# Patient Record
Sex: MALE | Race: BLACK OR AFRICAN AMERICAN
[De-identification: names, ages, dates, MRNs, and addresses within clinical notes are randomized per-mention and may not be internally consistent; named-entity substitution may affect disease eponyms.]

---

## 2019-02-21 ENCOUNTER — HOSPITAL ENCOUNTER (INPATIENT)
Dept: HOSPITAL 74 - YASAS | Age: 74
LOS: 4 days | Discharge: HOME | DRG: 897 | End: 2019-02-25
Attending: SURGERY | Admitting: SURGERY
Payer: COMMERCIAL

## 2019-02-21 VITALS — BODY MASS INDEX: 23.5 KG/M2

## 2019-02-21 DIAGNOSIS — J44.9: ICD-10-CM

## 2019-02-21 DIAGNOSIS — F12.20: ICD-10-CM

## 2019-02-21 DIAGNOSIS — K27.9: ICD-10-CM

## 2019-02-21 DIAGNOSIS — F10.230: Primary | ICD-10-CM

## 2019-02-21 DIAGNOSIS — K74.60: ICD-10-CM

## 2019-02-21 DIAGNOSIS — E78.00: ICD-10-CM

## 2019-02-21 DIAGNOSIS — Z89.021: ICD-10-CM

## 2019-02-21 DIAGNOSIS — F32.9: ICD-10-CM

## 2019-02-21 DIAGNOSIS — F41.8: ICD-10-CM

## 2019-02-21 PROCEDURE — HZ2ZZZZ DETOXIFICATION SERVICES FOR SUBSTANCE ABUSE TREATMENT: ICD-10-PCS | Performed by: SURGERY

## 2019-02-21 RX ADMIN — SERTRALINE HYDROCHLORIDE SCH MG: 50 TABLET ORAL at 17:34

## 2019-02-21 RX ADMIN — ATORVASTATIN CALCIUM SCH MG: 10 TABLET, FILM COATED ORAL at 22:22

## 2019-02-21 RX ADMIN — Medication SCH MG: at 22:21

## 2019-02-21 RX ADMIN — CYCLOBENZAPRINE HYDROCHLORIDE PRN MG: 10 TABLET, FILM COATED ORAL at 22:24

## 2019-02-21 RX ADMIN — PSYLLIUM HUSK SCH GM: 3.4 POWDER ORAL at 15:46

## 2019-02-21 NOTE — HP
CIWA Score


Nausea/Vomitin


Muscle Tremors: 2


Anxiety: 2


Agitation: 2


Paroxysmal Sweats: 1-Minimal Palms Moist


Orientation: 0-Oriented


Tacttile Disturbances: 1-Very Mild Itch/Numbness


Auditory Disturbances: 1-Very Mild


Visual Disturbances: 0-None


Headache: 2-Mild


CIWA-Ar Total Score: 13





- Admission Criteria


[facility] Guidelines: Admission for Medically Managed Detox: 


Requires at least one of the followin. CIWA greater than 12


2. Seizures within the past 24 hours


3. Delirium tremens within the past 24 hours


4. Hallucinations within the past 24 hours


5. Acute intervention needed for co  occurring medical disorder


6. Acute intervention needed for co  occurring psychiatric disorder


7. Severe withdrawal that cannot be handled at a lower level of care (continued


    vomiting, continued diarrhea, abnormal vital signs) requiring intravenous


    medication and/or fluids


8. Pregnancy





Patient presents the following: CIWA greater than 12


Admission Criteria Met: Admission criteria met





Admission ROS [facility]





- HPI


Chief Complaint: 





i need help to stop drinking alcohol and marijuana


Allergies/Adverse Reactions: 


 Allergies











Allergy/AdvReac Type Severity Reaction Status Date / Time


 


No Known Allergies Allergy   Verified 19 13:39











History of Present Illness: 





this 73 years old male with alcohol dependence and marijuana dependence,seeking 

detox from alcohol,marijuana,withdrawal symptom,last detox in  [facility]


hepatitis c treated


anxiety,depression


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 


no significant period of sobriety


cirrhosis


history of copd








- Ebola screening


Have you traveled outside of the country in the last 21 days: No


Have you had contact with anyone from an Ebola affected area: No


Have you been sick,other than usual withdrawal symptoms: No


Do you have a fever: No





- Review of Systems


Constitutional: Loss of Appetite, Malaise, Night Sweats, Changes in sleep, 

Weakness


EENT: reports: No Symptoms Reported


Respiratory: reports: No Symptoms reported


Cardiac: reports: No Symptoms Reported


GI: reports: Nausea, Abdominal cramping


: reports: No Symptoms Reported


Musculoskeletal: reports: Back Pain, Muscle Pain


Integumentary: reports: Dryness


Neuro: reports: Headache, Tremors


Endocrine: reports: No Symptoms Reported


Hematology: reports: No Symptoms Reported


Psychiatric: reports: No Sypmtoms Reported, Judgement Intact, Mood/Affect 

Appropiate, Orientated x3


Other Systems: Reviewed and Negative





Patient History





- Patient Medical History


Hx Anemia: No


Hx Asthma: No


Hx Chronic Obstructive Pulmonary Disease (COPD): No


Hx Cancer: No


Hx Cardiac Disorders: No


Hx Congestive Heart Failure: No


Hx Hypertension: No


Hx Hypercholesterolemia: No


Hx Pacemaker: No


HX Cerebrovascular Accident: No


Hx Seizures: No


Hx Dementia: No


Hx Diabetes: No


Hx Gastrointestinal Disorders: Yes (peptic ulcer)


Hx Liver Disease: Yes (cirrhois)


Hx Genitourinary Disorders: No


Hx Sexually Transmitted Disorders: No


Hx Renal Disease (ESRD): No


Hx Thyroid Disease: No


Hx Human Immunodeficiency Virus (HIV): No (in )


Hx Hepatitis C: Yes (treated)


Hx Depression: No


Hx Suicide Attempt: No


Hx Bipolar Disorder: No


Hx Schizophrenia: No


Other Medical History: no suicidal,no homicidal





- Patient Surgical History


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





- PPD History


Previous Implant?: Yes


Documented Results: Positive w/o proof





- Smoking Cessation


Smoking history: Never smoked





- Substance & Tx. History


Hx Alcohol Use: Yes


Hx Substance Use: Yes


Substance Use Type: Alcohol, Marijuana


Hx Substance Use Treatment: Yes ( [facility] )





- Substances Abused


  ** Alcohol


Route: Oral


Frequency: Daily


Amount used: 1 pint rum or wine


Age of first use: 16


Date of Last Use: 19





  ** Marijuana/Hashish


Route: Smoking


Frequency: Daily


Amount used: $20


Age of first use: 16


Date of Last Use: 19





Family Disease History





- Family Disease History


Family Disease History: Other: Brother (alcohol)





Admission Physical Exam [facility]





- Vital Signs


Vital Signs: 


 Vital Signs - 24 hr











  19





  11:24


 


Temperature 98.8 F


 


Pulse Rate 83


 


Respiratory 17





Rate 


 


Blood Pressure 116/68














- Physical


General Appearance: Yes: Moderate Distress, Tremorous, Irritable, Sweating, 

Anxious


HEENTM: Yes: Normal ENT Inspection, MACK[place], Pharynx Normal


Respiratory: Yes: Lungs Clear, Normal Breath Sounds, No Respiratory Distress


Neck: Yes: Supple, Trachea in good position


Breast: Yes: Within Normal Limits


Cardiology: Yes: Within Normal Limits, Regular Rhythm, S1, S2


Abdominal: Yes: Within Normal Limits, Normal Bowel Sounds, Non Tender, Flat, 

Soft


Genitourinary: Yes: Within Normal Limits


Back: Yes: Muscle Spasm


Musculoskeletal: Yes: Back pain, Muscle Pain


Extremities: Yes: Tremors


Neurological: Yes: CNs II-XII NML intact, Fully Oriented, Alert, Motor Strength 

5/5


Integumentary: Yes: Dry


Lymphatic: Yes: Within Normal Limits





- Diagnostic


(1) Alcohol dependence with uncomplicated withdrawal


Current Visit: Yes   Status: Acute   





(2) Cannabis dependence


Current Visit: Yes   Status: Acute   





(3) Hepatitis C


Current Visit: Yes   Status: Acute   





(4) Cirrhosis


Current Visit: Yes   Status: Acute   





(5) Hypercholesteremia


Current Visit: Yes   Status: Acute   





(6) COPD (chronic obstructive pulmonary disease)


Current Visit: Yes   Status: Acute   





(7) Anxiety and depression


Current Visit: Yes   Status: Acute   





(8) Peptic ulcer disease


Current Visit: Yes   Status: Acute   





Cleared for Admission [facility]





- Detox or Rehab


[facility] Level of Care: Medically Managed


Detox Regimen/Protocol: Librium (patient would like to be on librium,has bee in 

detox with librium before)





[facility] Breath Alcohol Content


Breath Alcohol Content: 0.02





Urine Drug Screen





- Results


Drug Screen Negative: No


Urine Drug Screen Results: THC-Marijuana, MET-Methamphetamine





Inpatient Rehab Admission





- Rehab Decision to Admit


Inpatient rehab admission?: No

## 2019-02-21 NOTE — CONSULT
[facility] Psychiatric Consult





- Data


Date of interview: 02/21/19


Admission source: [facility]


Identifying data: This is a 73 years old male, single, childless, living alone, 

on pension, with no psychiatric hospitalization history, with history of 

Alcohol dependence, is reporting alcihil withdrawal symptoms and seeking detox.


Substance Abuse History: Smoking history: Never smoked.  - Substance & Tx. 

History.  Hx Alcohol Use: Yes.  Hx Substance Use: Yes.  Substance Use Type: 

Alcohol, Marijuana.  Hx Substance Use Treatment: Yes ([facility] )


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 

finger (1971)


Psychiatric History: Patient reports history bof depression and anxiety, 

reports no history of psychiatric hospitalization, reports no suicidal, 

homicdial history,.  Patient is taking prior to admission:  Zoloft 100mg poqd


Physical/Sexual Abuse/Trauma History: Denies


Additional Comment: Zoloft 100mg poqd





Mental Status Exam





- Mental Status Exam


Alert and Oriented to: Person


Cognitive Function: Fair


Patient Appearance: Well Groomed


Mood: Apprehensive


Affect: Mood Congruent


Patient Behavior: Cooperative


Speech Pattern: Appropriate


Voice Loudness: Mildly Soft/Quiet


Thought Process: Goal Oriented


Thought Disorder: Being Controlled


Hallucinations: Denies


Suicidal Ideation: Denies


Homicidal Ideation: Denies


Insight/Judgement: Fair


Sleep: Difficulty falling asleep


Appetite: Fair


Muscle strength/Tone: Normal


Gait/Station: Normal


Additional Comments: Zoloft 100mg poqd





Psychiatric Findings





- Problem List (Axis 1, 2,3)


(1) Alcohol dependence with uncomplicated withdrawal


Current Visit: Yes   Status: Acute   





(2) Anxiety and depression


Current Visit: Yes   Status: Acute   





(3) COPD (chronic obstructive pulmonary disease)


Current Visit: Yes   Status: Acute   





(4) Cannabis dependence


Current Visit: Yes   Status: Acute   





(5) Cirrhosis


Current Visit: Yes   Status: Acute   





(6) Hepatitis C


Current Visit: Yes   Status: Acute   





(7) Hypercholesteremia


Current Visit: Yes   Status: Acute   





- Initial Treatment Plan


Initial Treatment Plan: Zoloft 100mg poqd

## 2019-02-22 LAB
ALBUMIN SERPL-MCNC: 4 G/DL (ref 3.4–5)
ALP SERPL-CCNC: 94 U/L (ref 45–117)
ALT SERPL-CCNC: 17 U/L (ref 13–61)
ANION GAP SERPL CALC-SCNC: 3 MMOL/L (ref 8–16)
APPEARANCE UR: CLEAR
AST SERPL-CCNC: 22 U/L (ref 15–37)
BILIRUB SERPL-MCNC: 0.8 MG/DL (ref 0.2–1)
BILIRUB UR STRIP.AUTO-MCNC: NEGATIVE MG/DL
BUN SERPL-MCNC: 11 MG/DL (ref 7–18)
CALCIUM SERPL-MCNC: 8.6 MG/DL (ref 8.5–10.1)
CHLORIDE SERPL-SCNC: 107 MMOL/L (ref 98–107)
CO2 SERPL-SCNC: 26 MMOL/L (ref 21–32)
COLOR UR: YELLOW
CREAT SERPL-MCNC: 1.2 MG/DL (ref 0.55–1.3)
DEPRECATED RDW RBC AUTO: 13.5 % (ref 11.9–15.9)
GLUCOSE SERPL-MCNC: 90 MG/DL (ref 74–106)
HCT VFR BLD CALC: 41 % (ref 35.4–49)
HGB BLD-MCNC: 14.3 GM/DL (ref 11.7–16.9)
KETONES UR QL STRIP: NEGATIVE
LEUKOCYTE ESTERASE UR QL STRIP.AUTO: NEGATIVE
MCH RBC QN AUTO: 33.5 PG (ref 25.7–33.7)
MCHC RBC AUTO-ENTMCNC: 34.8 G/DL (ref 32–35.9)
MCV RBC: 96.1 FL (ref 80–96)
NITRITE UR QL STRIP: NEGATIVE
PH UR: 5 [PH] (ref 5–8)
PLATELET # BLD AUTO: 209 K/MM3 (ref 134–434)
PMV BLD: 8.1 FL (ref 7.5–11.1)
POTASSIUM SERPLBLD-SCNC: 4.6 MMOL/L (ref 3.5–5.1)
PROT SERPL-MCNC: 8.4 G/DL (ref 6.4–8.2)
PROT UR QL STRIP: NEGATIVE
PROT UR QL STRIP: NEGATIVE
RBC # BLD AUTO: 4.26 M/MM3 (ref 4–5.6)
SICKLE CELL SCREEN: NEGATIVE
SODIUM SERPL-SCNC: 136 MMOL/L (ref 136–145)
SP GR UR: 1.02 (ref 1.01–1.03)
UROBILINOGEN UR STRIP-MCNC: NEGATIVE MG/DL (ref 0.2–1)
WBC # BLD AUTO: 3.4 K/MM3 (ref 4–10)

## 2019-02-22 RX ADMIN — SERTRALINE HYDROCHLORIDE SCH MG: 50 TABLET ORAL at 10:12

## 2019-02-22 RX ADMIN — ATORVASTATIN CALCIUM SCH MG: 10 TABLET, FILM COATED ORAL at 22:09

## 2019-02-22 RX ADMIN — RANITIDINE SCH MG: 150 TABLET ORAL at 10:12

## 2019-02-22 RX ADMIN — RANITIDINE SCH MG: 150 TABLET ORAL at 22:09

## 2019-02-22 RX ADMIN — PSYLLIUM HUSK SCH GM: 3.4 POWDER ORAL at 10:16

## 2019-02-22 RX ADMIN — Medication SCH TAB: at 10:12

## 2019-02-22 RX ADMIN — Medication SCH MG: at 22:09

## 2019-02-22 NOTE — PN
S CIWA





- CIWA Score


Nausea/Vomitin


Muscle Tremors: 2


Anxiety: 2


Agitation: 2


Paroxysmal Sweats: 3


Orientation: 0-Oriented


Tacttile Disturbances: 1-Very Mild Itch/Numbness


Auditory Disturbances: 0-None


Visual Disturbances: 0-None


Headache: 0-None Present


CIWA-Ar Total Score: 12





[facility] Progress Note (SOAP)


Subjective: 





interrupted sleep, sweats


Objective: 





19 13:05


 Vital Signs











Temperature  97.7 F   19 08:51


 


Pulse Rate  65   19 08:51


 


Respiratory Rate  18   19 08:51


 


Blood Pressure  130/78   19 08:51


 


O2 Sat by Pulse Oximetry (%)      








 Laboratory Tests











  19





  06:25 06:25 06:25


 


WBC  3.4 L  


 


RBC  4.26  


 


Hgb  14.3  


 


Hct  41.0  


 


MCV  96.1 H  


 


MCH  33.5  


 


MCHC  34.8  


 


RDW  13.5  


 


Plt Count  209  


 


MPV  8.1  


 


Sickle Cell Screen  Negative  


 


Sodium   136 


 


Potassium   4.6 


 


Chloride   107 


 


Carbon Dioxide   26 


 


Anion Gap   3 L 


 


BUN   11 


 


Creatinine   1.2 


 


Creat Clearance w eGFR   59.51 


 


Random Glucose   90 


 


Calcium   8.6 


 


Total Bilirubin   0.8 


 


AST   22 


 


ALT   17 


 


Alkaline Phosphatase   94 


 


Total Protein   8.4 H 


 


Albumin   4.0 


 


Urine Color   


 


Urine Appearance   


 


Urine pH   


 


Ur Specific Gravity   


 


Urine Protein   


 


Urine Glucose (UA)   


 


Urine Ketones   


 


Urine Blood   


 


Urine Nitrite   


 


Urine Bilirubin   


 


Urine Urobilinogen   


 


Ur Leukocyte Esterase   


 


RPR Titer    Nonreactive














  19





  08:30


 


WBC 


 


RBC 


 


Hgb 


 


Hct 


 


MCV 


 


MCH 


 


MCHC 


 


RDW 


 


Plt Count 


 


MPV 


 


Sickle Cell Screen 


 


Sodium 


 


Potassium 


 


Chloride 


 


Carbon Dioxide 


 


Anion Gap 


 


BUN 


 


Creatinine 


 


Creat Clearance w eGFR 


 


Random Glucose 


 


Calcium 


 


Total Bilirubin 


 


AST 


 


ALT 


 


Alkaline Phosphatase 


 


Total Protein 


 


Albumin 


 


Urine Color  Yellow


 


Urine Appearance  Clear


 


Urine pH  5.0


 


Ur Specific Gravity  1.020


 


Urine Protein  Negative


 


Urine Glucose (UA)  Negative


 


Urine Ketones  Negative


 


Urine Blood  Negative


 


Urine Nitrite  Negative


 


Urine Bilirubin  Negative


 


Urine Urobilinogen  Negative


 


Ur Leukocyte Esterase  Negative


 


RPR Titer 








pt aox3 in nad ambulating 


Assessment: 





19 13:05


withdrawal sx's 


Plan: 





cont. detox 


increase fluids 


ensure bid

## 2019-02-23 RX ADMIN — SERTRALINE HYDROCHLORIDE SCH MG: 50 TABLET ORAL at 10:10

## 2019-02-23 RX ADMIN — RANITIDINE SCH MG: 150 TABLET ORAL at 10:10

## 2019-02-23 RX ADMIN — Medication SCH TAB: at 10:10

## 2019-02-23 RX ADMIN — CYCLOBENZAPRINE HYDROCHLORIDE PRN MG: 10 TABLET, FILM COATED ORAL at 23:09

## 2019-02-23 RX ADMIN — Medication SCH MG: at 23:09

## 2019-02-23 RX ADMIN — HYDROXYZINE PAMOATE PRN MG: 50 CAPSULE ORAL at 23:09

## 2019-02-23 RX ADMIN — RANITIDINE SCH MG: 150 TABLET ORAL at 23:09

## 2019-02-23 RX ADMIN — ATORVASTATIN CALCIUM SCH MG: 10 TABLET, FILM COATED ORAL at 23:09

## 2019-02-23 RX ADMIN — PSYLLIUM HUSK SCH GM: 3.4 POWDER ORAL at 10:09

## 2019-02-23 NOTE — EKG
Test Reason : 

Blood Pressure : ***/*** mmHG

Vent. Rate : 055 BPM     Atrial Rate : 055 BPM

   P-R Int : 230 ms          QRS Dur : 088 ms

    QT Int : 430 ms       P-R-T Axes : 065 -02 072 degrees

   QTc Int : 411 ms

 

SINUS BRADYCARDIA WITH 1ST DEGREE A-V BLOCK

NONSPECIFIC T WAVE ABNORMALITY

ABNORMAL ECG

NO PREVIOUS ECGS AVAILABLE

Confirmed by STEPHANE HATCH MD ([id number]) on 2/23/2019 10:19:08 PM

 

Referred By:             Confirmed By:STEPHANE HATCH MD

## 2019-02-23 NOTE — PN
S CIWA





- CIWA Score


Nausea/Vomitin-No Nausea/No Vomiting


Muscle Tremors: 3


Anxiety: 4-Mod. Anxious/Guarded


Agitation: 3


Paroxysmal Sweats: 1-Minimal Palms Moist


Orientation: 0-Oriented


Tacttile Disturbances: 0-None


Auditory Disturbances: 0-None


Visual Disturbances: 0-None


Headache: 0-None Present


CIWA-Ar Total Score: 11





[facility] Progress Note (SOAP)


Subjective: 





SLIGHT ANXIETY,SWEATS, DECREASED APPETITE.


Objective: 





19 13:48


 Vital Signs











  19





  06:00 10:03


 


Temperature 96.8 F L 98.1 F


 


Pulse Rate 62 86


 


Respiratory 18 18





Rate  


 


Blood Pressure 116/71 125/80








 Laboratory Tests











  19





  06:25 06:25 06:25


 


WBC  3.4 L  


 


RBC  4.26  


 


Hgb  14.3  


 


Hct  41.0  


 


MCV  96.1 H  


 


MCH  33.5  


 


MCHC  34.8  


 


RDW  13.5  


 


Plt Count  209  


 


MPV  8.1  


 


Sickle Cell Screen  Negative  


 


Sodium   136 


 


Potassium   4.6 


 


Chloride   107 


 


Carbon Dioxide   26 


 


Anion Gap   3 L 


 


BUN   11 


 


Creatinine   1.2 


 


Creat Clearance w eGFR   59.51 


 


Random Glucose   90 


 


Calcium   8.6 


 


Total Bilirubin   0.8 


 


AST   22 


 


ALT   17 


 


Alkaline Phosphatase   94 


 


Total Protein   8.4 H 


 


Albumin   4.0 


 


Urine Color   


 


Urine Appearance   


 


Urine pH   


 


Ur Specific Gravity   


 


Urine Protein   


 


Urine Glucose (UA)   


 


Urine Ketones   


 


Urine Blood   


 


Urine Nitrite   


 


Urine Bilirubin   


 


Urine Urobilinogen   


 


Ur Leukocyte Esterase   


 


RPR Titer    Nonreactive














  19





  08:30


 


WBC 


 


RBC 


 


Hgb 


 


Hct 


 


MCV 


 


MCH 


 


MCHC 


 


RDW 


 


Plt Count 


 


MPV 


 


Sickle Cell Screen 


 


Sodium 


 


Potassium 


 


Chloride 


 


Carbon Dioxide 


 


Anion Gap 


 


BUN 


 


Creatinine 


 


Creat Clearance w eGFR 


 


Random Glucose 


 


Calcium 


 


Total Bilirubin 


 


AST 


 


ALT 


 


Alkaline Phosphatase 


 


Total Protein 


 


Albumin 


 


Urine Color  Yellow


 


Urine Appearance  Clear


 


Urine pH  5.0


 


Ur Specific Gravity  1.020


 


Urine Protein  Negative


 


Urine Glucose (UA)  Negative


 


Urine Ketones  Negative


 


Urine Blood  Negative


 


Urine Nitrite  Negative


 


Urine Bilirubin  Negative


 


Urine Urobilinogen  Negative


 


Ur Leukocyte Esterase  Negative


 


RPR Titer 











Assessment: 





19 13:48


WITHDRAWALS  SX


Plan: 





CONTINUE DETOX

## 2019-02-24 RX ADMIN — Medication SCH TAB: at 10:44

## 2019-02-24 RX ADMIN — RANITIDINE SCH MG: 150 TABLET ORAL at 10:44

## 2019-02-24 RX ADMIN — HYDROXYZINE PAMOATE PRN MG: 50 CAPSULE ORAL at 23:07

## 2019-02-24 RX ADMIN — RANITIDINE SCH MG: 150 TABLET ORAL at 23:07

## 2019-02-24 RX ADMIN — SERTRALINE HYDROCHLORIDE SCH MG: 50 TABLET ORAL at 10:44

## 2019-02-24 RX ADMIN — PSYLLIUM HUSK SCH GM: 3.4 POWDER ORAL at 10:45

## 2019-02-24 RX ADMIN — Medication SCH MG: at 23:07

## 2019-02-24 RX ADMIN — CYCLOBENZAPRINE HYDROCHLORIDE PRN MG: 10 TABLET, FILM COATED ORAL at 23:07

## 2019-02-24 RX ADMIN — ATORVASTATIN CALCIUM SCH MG: 10 TABLET, FILM COATED ORAL at 23:07

## 2019-02-24 NOTE — PN
[facility] Progress Note (SOAP)


Subjective: 





Denies withdrawal symptoms; appears anxious


Objective: 





02/24/19 16:20


 Last Vital Signs











Temp Pulse Resp BP Pulse Ox


 


 97.9 F   82   16   121/78    


 


 02/24/19 13:28  02/24/19 13:28  02/24/19 13:28  02/24/19 13:28   








 Laboratory Tests











  02/22/19 02/22/19 02/22/19





  06:25 06:25 06:25


 


WBC  3.4 L  


 


RBC  4.26  


 


Hgb  14.3  


 


Hct  41.0  


 


MCV  96.1 H  


 


MCH  33.5  


 


MCHC  34.8  


 


RDW  13.5  


 


Plt Count  209  


 


MPV  8.1  


 


Sickle Cell Screen  Negative  


 


Sodium   136 


 


Potassium   4.6 


 


Chloride   107 


 


Carbon Dioxide   26 


 


Anion Gap   3 L 


 


BUN   11 


 


Creatinine   1.2 


 


Creat Clearance w eGFR   59.51 


 


Random Glucose   90 


 


Calcium   8.6 


 


Total Bilirubin   0.8 


 


AST   22 


 


ALT   17 


 


Alkaline Phosphatase   94 


 


Total Protein   8.4 H 


 


Albumin   4.0 


 


Urine Color   


 


Urine Appearance   


 


Urine pH   


 


Ur Specific Gravity   


 


Urine Protein   


 


Urine Glucose (UA)   


 


Urine Ketones   


 


Urine Blood   


 


Urine Nitrite   


 


Urine Bilirubin   


 


Urine Urobilinogen   


 


Ur Leukocyte Esterase   


 


RPR Titer    Nonreactive














  02/22/19





  08:30


 


WBC 


 


RBC 


 


Hgb 


 


Hct 


 


MCV 


 


MCH 


 


MCHC 


 


RDW 


 


Plt Count 


 


MPV 


 


Sickle Cell Screen 


 


Sodium 


 


Potassium 


 


Chloride 


 


Carbon Dioxide 


 


Anion Gap 


 


BUN 


 


Creatinine 


 


Creat Clearance w eGFR 


 


Random Glucose 


 


Calcium 


 


Total Bilirubin 


 


AST 


 


ALT 


 


Alkaline Phosphatase 


 


Total Protein 


 


Albumin 


 


Urine Color  Yellow


 


Urine Appearance  Clear


 


Urine pH  5.0


 


Ur Specific Gravity  1.020


 


Urine Protein  Negative


 


Urine Glucose (UA)  Negative


 


Urine Ketones  Negative


 


Urine Blood  Negative


 


Urine Nitrite  Negative


 


Urine Bilirubin  Negative


 


Urine Urobilinogen  Negative


 


Ur Leukocyte Esterase  Negative


 


RPR Titer 








Labs reviewed


Assessment: 





02/24/19 16:20


Withdrawal symptoms


Plan: 





Continue detox


Encouraged PO water hydration

## 2019-02-25 VITALS — SYSTOLIC BLOOD PRESSURE: 98 MMHG | DIASTOLIC BLOOD PRESSURE: 64 MMHG | HEART RATE: 99 BPM | TEMPERATURE: 97.7 F

## 2019-02-25 NOTE — DS
[facility] Detox Discharge Summary


Admission Date: 


02/21/19





Discharge Date: 02/25/19





- History


Present History: Alcohol Dependence, Cannabis Dependence





- Physical Exam Results


Vital Signs: 


 Vital Signs











Temperature  97 F L  02/25/19 06:42


 


Pulse Rate  89   02/25/19 06:42


 


Respiratory Rate  18   02/25/19 06:42


 


Blood Pressure  108/74   02/25/19 06:42


 


O2 Sat by Pulse Oximetry (%)      














- Treatment


Hospital Course: Detox Protocol Followed, Detoxed Safely, Responded well, 

Discharged Condition Good, Rehab Referral Accepted





- Medication


Discharge Medications: 


Ambulatory Orders





Albuterol Sulfate Inhaler - [Ventolin Hfa Inhaler -] 2 inh PO Q4H PRN 02/21/19 


Atorvastatin Ca [Lipitor] 10 mg PO HS 02/21/19 


Cyclobenzaprine HCl [Flexeril -] 10 mg PO TID PRN 02/21/19 


Doxylamine Succinate [Unisom] 25 mg PO HS 02/21/19 


Psyllium Husk [Metamucil] 660 gm PO DAILY 02/21/19 


Sertraline HCl [Zoloft] 100 mg PO DAILY #30 tablet 02/21/19 











- Diagnosis


(1) Alcohol dependence with uncomplicated withdrawal


Current Visit: Yes   Status: Chronic   





(2) Anxiety and depression


Current Visit: Yes   Status: Acute   





(3) COPD (chronic obstructive pulmonary disease)


Current Visit: Yes   Status: Acute   





(4) Cannabis dependence


Current Visit: Yes   Status: Chronic   





(5) Cirrhosis


Current Visit: Yes   Status: Chronic   





(6) Hepatitis C


Current Visit: Yes   Status: Chronic   


Qualifiers: 


   Viral hepatitis chronicity: unspecified   Hepatic coma status: without 

hepatic coma   Qualified Code(s): B19.20 - Unspecified viral hepatitis C 

without hepatic coma   





(7) Hypercholesteremia


Current Visit: Yes   Status: Chronic   





(8) Peptic ulcer disease


Current Visit: Yes   Status: Chronic   





- AMA


Did Patient Leave Against Medical Advice: No (pt declined referral to rehab)

## 2019-12-14 ENCOUNTER — HOSPITAL ENCOUNTER (INPATIENT)
Dept: HOSPITAL 74 - YASAS | Age: 74
LOS: 3 days | Discharge: HOME | DRG: 897 | End: 2019-12-17
Attending: ALLERGY & IMMUNOLOGY | Admitting: ALLERGY & IMMUNOLOGY
Payer: COMMERCIAL

## 2019-12-14 VITALS — BODY MASS INDEX: 22.1 KG/M2

## 2019-12-14 DIAGNOSIS — F32.9: ICD-10-CM

## 2019-12-14 DIAGNOSIS — F12.20: ICD-10-CM

## 2019-12-14 DIAGNOSIS — F10.230: Primary | ICD-10-CM

## 2019-12-14 DIAGNOSIS — J43.8: ICD-10-CM

## 2019-12-14 DIAGNOSIS — Z87.891: ICD-10-CM

## 2019-12-14 DIAGNOSIS — Z86.19: ICD-10-CM

## 2019-12-14 DIAGNOSIS — M54.5: ICD-10-CM

## 2019-12-14 DIAGNOSIS — Z89.021: ICD-10-CM

## 2019-12-14 DIAGNOSIS — G89.29: ICD-10-CM
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
## 2019-12-14 DIAGNOSIS — E78.5: ICD-10-CM

## 2019-12-14 DIAGNOSIS — K74.60: ICD-10-CM

## 2019-12-14 DIAGNOSIS — Z91.81: ICD-10-CM

## 2019-12-14 DIAGNOSIS — F41.9: ICD-10-CM

## 2019-12-14 DIAGNOSIS — Z87.11: ICD-10-CM
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
## 2019-12-14 PROCEDURE — HZ2ZZZZ DETOXIFICATION SERVICES FOR SUBSTANCE ABUSE TREATMENT: ICD-10-PCS | Performed by: ALLERGY & IMMUNOLOGY

## 2019-12-14 RX ADMIN — SERTRALINE HYDROCHLORIDE SCH MG: 50 TABLET ORAL at 15:59

## 2019-12-14 RX ADMIN — Medication SCH MG: at 23:07

## 2019-12-14 RX ADMIN — Medication SCH: at 23:11

## 2019-12-14 RX ADMIN — LIDOCAINE SCH PATCH: 50 PATCH TOPICAL at 15:58

## 2019-12-14 RX ADMIN — ATORVASTATIN CALCIUM SCH MG: 10 TABLET, FILM COATED ORAL at 23:07

## 2019-12-14 RX ADMIN — Medication PRN MG: at 23:07

## 2019-12-14 NOTE — PN
[facility] Progress Note


Note: 





ASKED TO SEE CLIENT FOR REPORTED FALL. CLIENT STATES HE FELL ON HIS BUTTOCKS 

AFTER  FEELING A SPASM IN HIS LEG. HE DENIES ANY PAIN, INJURIES TO INCLUDE 

HITTING HIS HEAD. 








 Vital Signs (72 hours)











  12/14/19 12/14/19 12/14/19





  20:20 20:44 21:13


 


Temperature 97.5 F L 97.5 F L 97.1 F L


 


Pulse Rate 77 77 73


 


Respiratory 18 18 16





Rate   


 


Blood Pressure 140/84 140/84 126/78














PT SEEN LYING IN BED A/O X3 NAD


HEAD: NCAT, PERRLA, EOMI


SKIN: INTACT NO INJURIES OR BRUISING


EXTREMITIES: FROM W/O LIMITATION


BACK: NL INSPECTION





A- S/P UNWITNESSED FALL





P- FALL PROTOCOL #1


CLIENT DECLINES HEAD CT. RISKS TO INCLUDE BLEEDING D/W CLIENT. CLIENT ACCEPTS 

RISK


CONT TO MONITOR CLOSELY/ MAINTAIN FALL SAFETY PRECAUTION

## 2019-12-14 NOTE — HP
CIWA Score


Nausea/Vomitin


Muscle Tremors: 4-Moderate,w/Arms Extend


Anxiety: 4-Mod. Anxious/Guarded


Agitation: 1-Slight > Activity


Paroxysmal Sweats: No Perspiration


Orientation: 1-Uncertain about Date


Tacttile Disturbances: 1-Very Mild Itch/Numbness


Auditory Disturbances: 1-Very Mild


Visual Disturbances: 1-Very Mild Sensitivity


Headache: 2-Mild


CIWA-Ar Total Score: 17





- Admission Criteria


[facility] Guidelines: Admission for Medically Managed Detox: 


Requires at least one of the followin. CIWA greater than 12


2. Seizures within the past 24 hours


3. Delirium tremens within the past 24 hours


4. Hallucinations within the past 24 hours


5. Acute intervention needed for co  occurring medical disorder


6. Acute intervention needed for co  occurring psychiatric disorder


7. Severe withdrawal that cannot be handled at a lower level of care (continued


    vomiting, continued diarrhea, abnormal vital signs) requiring intravenous


    medication and/or fluids


8. Pregnancy





Patient presents the following: CIWA greater than 12


Admission Criteria Met: Admission criteria met





Admitting History and Physical





- Admission


History Source: Patient, Medical Record





- Past Medical History


Cardiovascular: Yes: Hyperlipdemia


Pulmonary: Yes: COPD


Hepatobiliary: Yes: Cirrhosis


Psych: Yes: Addictions, Depression


Musculoskeletal: Yes: Chronic low back pain





- Smoking History


Smoking history: Former smoker


Have you smoked in the past 12 months: No


If you are a former smoker, when did you quit?: 





- Alcohol/Substance Use


Hx Alcohol Use: Yes





- Social History


Usual Living Arrangement: Yes: Alone





Admission ROS [facility]





- [facility]


Chief Complaint: 


I need to stop drinking and smoking reefer - it's causing me a whole lot of 

problems especially money ones and with my woman


Allergies/Adverse Reactions: 


 Allergies











Allergy/AdvReac Type Severity Reaction Status Date / Time


 


No Known Allergies Allergy   Verified 19 11:03











History of Present Illness: 


75 yo gentleman here for detox from alcohol - patient states he can't stop 

drinking - starts early and goes all day.  Previously here in detox  19 - 

did okay for about two months then relapsed, never followed up on outpatient 

referral  "I thought I was okay".   No seizures but has had black outs in past.

  Has cirrhosis from drinking.  No recent ED visits.  Patient is not on 

disability.  Patient lives alone in his apartment.  He has depression but never 

hospitalized, on zoloft from primary, which he takes daily.


Exam Limitations: No Limitations





- Ebola screening


Have you traveled outside of the country in the last 21 days: No (N)


Have you had contact with anyone from an Ebola affected area: No


Do you have a fever: No





- Review of Systems


Constitutional: Malaise, Changes in sleep, Weakness


EENT: reports: No Symptoms Reported


Respiratory: reports: Cough (sometimes a cough which just come over me - not 

every day but a few times a week - states he thinks it's due to the marijuana 

smoking - coughs up 'junk' (dark speckled phlegm))


Cardiac: reports: No Symptoms Reported


GI: reports: Nausea, Poor Appetite, Poor Fluid Intake, Abdominal cramping


: reports: Frequency


Musculoskeletal: reports: Back Pain, Muscle Weakness


Integumentary: reports: Dryness


Neuro: reports: Headache, Numbness, Tremors, Weakness


Endocrine: reports: No Symptoms Reported


Hematology: reports: No Symptoms Reported


Psychiatric: reports: Judgement Intact, Mood/Affect Appropiate, Orientated x3, 

Anxious


Other Systems: Reviewed and Negative





Patient History





- Patient Medical History


Hx Anemia: No


Hx Asthma: No


Hx Chronic Obstructive Pulmonary Disease (COPD): Yes (on inhaler)


Hx Cancer: No


Hx Cardiac Disorders: No


Hx Congestive Heart Failure: No


Hx Hypertension: No


Hx Hypercholesterolemia: Yes (on medication)


Hx Pacemaker: No


HX Cerebrovascular Accident: No


Hx Seizures: No


Hx Dementia: No


Hx Diabetes: No


Hx Gastrointestinal Disorders: Yes (history of peptic ulcer in his 20s; 

constipation)


Hx Liver Disease: Yes (cirrhosis; hep C treated)


Hx Genitourinary Disorders: Yes (? BPH - nocturia)


Hx Sexually Transmitted Disorders: No


Hx Renal Disease (ESRD): No


Hx Thyroid Disease: No


Hx Human Immunodeficiency Virus (HIV): No


Hx Hepatitis C: Yes (treated )


Hx Depression: Yes (on meds from primary; never hospitalized)


Hx Suicide Attempt: No (ideation only 'sometimes')


Hx Bipolar Disorder: No


Hx Schizophrenia: No


Other Medical History: back pain/sciatica - hx epidurals





- Patient Surgical History


Past Surgical History: Yes


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





- PPD History


Previous Implant?: Yes


Documented Results: Positive w/o proof (took INH about 20 years ago)


PPD to be Administered?: No





- Reproductive History


Patient is a Female of Child Bearing Age (11 -55 yrs old): No





- Smoking Cessation


Smoking history: Former smoker


Have you smoked in the past 12 months: No


If you are a former smoker, when did you quit?: 


Hx Chewing Tobacco Use: No


Initiated information on smoking cessation: No





- Substance & Tx. History


Hx Alcohol Use: Yes


Hx Substance Use: Yes


Substance Use Type: Alcohol, Marijuana


Hx Substance Use Treatment: Yes (detox, rehab )





- Substances abused


  ** Alcohol


Substance route: Oral


Frequency: Daily


Amount used: 1 pint of Rum; 3 glasses wine daily


Age of first use: 15


Date of last use: 19





  ** Marijuana/Hashish


Substance route: Smoking


Frequency: Daily


Amount used: $100/month


Age of first use: 15


Date of last use: 19





Admission Physical Exam [facility]





- Vital Signs


Vital Signs: 


 Vital Signs - 24 hr











  19





  11:08


 


Temperature 98.4 F


 


Pulse Rate 78


 


Respiratory 16





Rate 


 


Blood Pressure 127/89














- Physical


General Appearance: Yes: Nourished, Appropriately Dressed, Moderate Distress, 

Thin, Tremorous, Anxious


HEENTM: Yes: EOMI, Hearing grossly Normal, Normocephalic, Normal Voice, Pharynx 

Normal


Respiratory: Yes: No Respiratory Distress


Neck: Yes: No masses,lesions,Nodules


Breast: Yes: Breast Exam Deferred


Cardiology: Yes: Regular Rhythm, Regular Rate


Abdominal: Yes: Flat, Soft


Genitourinary: Yes: Frequency, Nocturia


Back: Yes: Normal Inspection


Musculoskeletal: Yes: full range of Motion, Gait Steady, Back pain


Extremities: Yes: Normal Inspection, Normal Range of Motion, Tremors


Neurological: Yes: Fully Oriented, Alert, Normal Mood/Affect, Normal Response, 

Numbness


Integumentary: Yes: Normal Color, Dry, Warm


Lymphatic: Yes: Within Normal Limits





- Diagnostic


(1) Alcohol dependence with uncomplicated withdrawal


Current Visit: Yes   Status: Chronic   





(2) Cannabis dependence


Current Visit: Yes   Status: Chronic   





(3) Anxiety and depression


Current Visit: Yes   Status: Acute   





(4) COPD (chronic obstructive pulmonary disease)


Current Visit: Yes   Status: Acute   


Qualifiers: 


   COPD type: unspecified COPD   Qualified Code(s): J44.9 - Chronic obstructive 

pulmonary disease, unspecified   





(5) Cirrhosis


Current Visit: Yes   Status: Chronic   


Qualifiers: 


   Hepatic cirrhosis type: alcoholic cirrhosis   Ascites presence: without 

ascites   Qualified Code(s): K70.30 - Alcoholic cirrhosis of liver without 

ascites   





(6) Hypercholesteremia


Current Visit: No   Status: Chronic   





(7) Peptic ulcer disease


Current Visit: Yes   Status: Resolved   





(8) Hepatitis C virus infection cured after antiviral drug therapy


Current Visit: Yes   Status: Chronic   





Cleared for Admission S





- Detox or Rehab


[facility] Level of Care: Medically Managed


Detox Regimen/Protocol: Ativan





Breathalyzer





- Breathalyzer


Breathalyzer: 0





Urine Drug Screen





- Test Device


Lot number: [id number]


Expiration date: 21





- Control


Is test valid?: Yes





- Results


Drug screen NEGATIVE: No


Urine drug screen results: THC-Marijuana





Inpatient Rehab Admission





- Rehab Decision to Admit


Inpatient rehab admission?: No

## 2019-12-15 LAB
ALBUMIN SERPL-MCNC: 3.5 G/DL (ref 3.4–5)
ALP SERPL-CCNC: 91 U/L (ref 45–117)
ALT SERPL-CCNC: 15 U/L (ref 13–61)
ANION GAP SERPL CALC-SCNC: 7 MMOL/L (ref 8–16)
AST SERPL-CCNC: 20 U/L (ref 15–37)
BILIRUB SERPL-MCNC: 0.9 MG/DL (ref 0.2–1)
BUN SERPL-MCNC: 9.7 MG/DL (ref 7–18)
CALCIUM SERPL-MCNC: 8.7 MG/DL (ref 8.5–10.1)
CHLORIDE SERPL-SCNC: 107 MMOL/L (ref 98–107)
CO2 SERPL-SCNC: 26 MMOL/L (ref 21–32)
CREAT SERPL-MCNC: 1.1 MG/DL (ref 0.55–1.3)
DEPRECATED RDW RBC AUTO: 14 % (ref 11.9–15.9)
GLUCOSE SERPL-MCNC: 98 MG/DL (ref 74–106)
HCT VFR BLD CALC: 39.2 % (ref 35.4–49)
HGB BLD-MCNC: 13.2 GM/DL (ref 11.7–16.9)
MCH RBC QN AUTO: 32.1 PG (ref 25.7–33.7)
MCHC RBC AUTO-ENTMCNC: 33.6 G/DL (ref 32–35.9)
MCV RBC: 95.4 FL (ref 80–96)
PLATELET # BLD AUTO: 212 K/MM3 (ref 134–434)
PMV BLD: 7.8 FL (ref 7.5–11.1)
POTASSIUM SERPLBLD-SCNC: 3.9 MMOL/L (ref 3.5–5.1)
PROT SERPL-MCNC: 7.4 G/DL (ref 6.4–8.2)
RBC # BLD AUTO: 4.11 M/MM3 (ref 4–5.6)
SODIUM SERPL-SCNC: 140 MMOL/L (ref 136–145)
WBC # BLD AUTO: 3 K/MM3 (ref 4–10)

## 2019-12-15 RX ADMIN — Medication SCH: at 22:31

## 2019-12-15 RX ADMIN — SERTRALINE HYDROCHLORIDE SCH MG: 50 TABLET ORAL at 10:36

## 2019-12-15 RX ADMIN — ANALGESIC BALM SCH: 1.74; 4.06 OINTMENT TOPICAL at 22:29

## 2019-12-15 RX ADMIN — LIDOCAINE SCH PATCH: 50 PATCH TOPICAL at 10:36

## 2019-12-15 RX ADMIN — ANALGESIC BALM SCH: 1.74; 4.06 OINTMENT TOPICAL at 15:30

## 2019-12-15 RX ADMIN — Medication SCH MG: at 22:30

## 2019-12-15 RX ADMIN — Medication SCH TAB: at 10:36

## 2019-12-15 RX ADMIN — ATORVASTATIN CALCIUM SCH MG: 10 TABLET, FILM COATED ORAL at 22:30

## 2019-12-15 RX ADMIN — Medication PRN MG: at 22:30

## 2019-12-15 NOTE — PN
S CIWA





- CIWA Score


Nausea/Vomitin-Mild Nausea/No Vomiting


Muscle Tremors: 4-Moderate,w/Arms Extend


Anxiety: 3


Agitation: 2


Paroxysmal Sweats: 2


Orientation: 1-Uncertain about Date (date of week)


Tacttile Disturbances: 0-None


Auditory Disturbances: 0-None


Visual Disturbances: 0-None


Headache: 1-Very Mild


CIWA-Ar Total Score: 14





[facility] Progress Note (SOAP)


Subjective: 





74 years old male admitted on 19 for alcohol withdrawal sx management


treating with atian detox regimen


fell on right hip 19


denies right hip pain no bruises noted 


ambulating from bed to bathroom steady gait


Objective: 





12/15/19 10:23


 Vital Signs











Temperature  97.7 F   12/15/19 08:20


 


Pulse Rate  90   12/15/19 08:20


 


Respiratory Rate  18   12/15/19 08:20


 


Blood Pressure  127/76   12/15/19 08:20


 


O2 Sat by Pulse Oximetry (%)      











12/15/19 10:23


lab pending


Assessment: 





12/15/19 10:24


alcohol withdrawal 


12/15/19 10:25


fall protocol


bengay


Plan: 





ativan regimen

## 2019-12-16 RX ADMIN — Medication SCH MG: at 22:06

## 2019-12-16 RX ADMIN — Medication SCH: at 22:07

## 2019-12-16 RX ADMIN — Medication PRN MG: at 22:06

## 2019-12-16 RX ADMIN — LIDOCAINE SCH PATCH: 50 PATCH TOPICAL at 11:35

## 2019-12-16 RX ADMIN — ANALGESIC BALM SCH: 1.74; 4.06 OINTMENT TOPICAL at 22:07

## 2019-12-16 RX ADMIN — Medication SCH TAB: at 10:12

## 2019-12-16 RX ADMIN — ANALGESIC BALM SCH: 1.74; 4.06 OINTMENT TOPICAL at 10:13

## 2019-12-16 RX ADMIN — SERTRALINE HYDROCHLORIDE SCH MG: 50 TABLET ORAL at 10:12

## 2019-12-16 RX ADMIN — ATORVASTATIN CALCIUM SCH MG: 10 TABLET, FILM COATED ORAL at 22:06

## 2019-12-16 NOTE — PN
[facility] CIWA





- CIWA Score


Nausea/Vomitin-Mild Nausea/No Vomiting


Muscle Tremors: 3


Anxiety: 3


Agitation: 2


Paroxysmal Sweats: 1-Minimal Palms Moist


Orientation: 0-Oriented


Tacttile Disturbances: 1-Very Mild Itch/Numbness


Auditory Disturbances: 0-None


Visual Disturbances: 0-None


Headache: 0-None Present


CIWA-Ar Total Score: 11





[facility] Progress Note (SOAP)


Subjective: 





74 years old male admitted  on 19 for alcohol withdrawal sx management 

treating with ativan detox regimen 


ate breakfast tolerated food and fluid well denies dizziness ambulating on 

hallway steady gait denies hip pain 


Objective: 





19 11:07


 Vital Signs











Temperature  97.8 F   19 09:18


 


Pulse Rate  93 H  19 09:18


 


Respiratory Rate  16   19 09:18


 


Blood Pressure  118/75   19 09:18


 


O2 Sat by Pulse Oximetry (%)      








 Laboratory Last Values











WBC  3.0 K/mm3 (4.0-10.0)  L  12/15/19  08:00    


 


RBC  4.11 M/mm3 (4.00-5.60)   12/15/19  08:00    


 


Hgb  13.2 GM/dL (11.7-16.9)   12/15/19  08:00    


 


Hct  39.2 % (35.4-49)   12/15/19  08:00    


 


MCV  95.4 fl (80-96)   12/15/19  08:00    


 


MCH  32.1 pg (25.7-33.7)   12/15/19  08:00    


 


MCHC  33.6 g/dl (32.0-35.9)   12/15/19  08:00    


 


RDW  14.0 % (11.9-15.9)   12/15/19  08:00    


 


Plt Count  212 K/MM3 (134-434)   12/15/19  08:00    


 


MPV  7.8 fl (7.5-11.1)   12/15/19  08:00    


 


Sodium  140 mmol/L (136-145)   12/15/19  08:00    


 


Potassium  3.9 mmol/L (3.5-5.1)   12/15/19  08:00    


 


Chloride  107 mmol/L ()   12/15/19  08:00    


 


Carbon Dioxide  26 mmol/L (21-32)   12/15/19  08:00    


 


Anion Gap  7 MMOL/L (8-16)  L  12/15/19  08:00    


 


BUN  9.7 mg/dL (7-18)   12/15/19  08:00    


 


Creatinine  1.1 mg/dL (0.55-1.3)   12/15/19  08:00    


 


Est GFR (CKD-EPI)AfAm  76.78   12/15/19  08:00    


 


Est GFR (CKD-EPI)NonAf  66.24   12/15/19  08:00    


 


Random Glucose  98 mg/dL ()   12/15/19  08:00    


 


Calcium  8.7 mg/dL (8.5-10.1)   12/15/19  08:00    


 


Total Bilirubin  0.9 mg/dL (0.2-1)   12/15/19  08:00    


 


AST  20 U/L (15-37)   12/15/19  08:00    


 


ALT  15 U/L (13-61)   12/15/19  08:00    


 


Alkaline Phosphatase  91 U/L ()   12/15/19  08:00    


 


Total Protein  7.4 g/dl (6.4-8.2)   12/15/19  08:00    


 


Albumin  3.5 g/dl (3.4-5.0)   12/15/19  08:00    


 


RPR Titer  Nonreactive  (NONREACTIVE)   12/15/19  08:00    








lab noted


Assessment: 





19 11:07


alcohol withdrawal 


Plan: 





ativan regimen

## 2019-12-16 NOTE — DS
[facility] Detox Discharge Summary


Admission Date: 


12/14/19





Discharge Date: 12/16/19





- Physical Exam Results


Vital Signs: 


 Vital Signs











Temperature  98.0 F   12/16/19 13:07


 


Pulse Rate  76   12/16/19 13:07


 


Respiratory Rate  18   12/16/19 13:07


 


Blood Pressure  102/61   12/16/19 13:07


 


O2 Sat by Pulse Oximetry (%)      














- Treatment


Hospital Course: Detox Protocol Followed, Discharged Condition Good





- Medication


Discharge Medications: 


Ambulatory Orders





Albuterol Sulfate Inhaler - [Ventolin Hfa Inhaler -] 2 inh PO Q4H PRN 02/21/19 


Atorvastatin Ca [Lipitor] 10 mg PO HS 02/21/19 


Cyclobenzaprine HCl [Flexeril -] 10 mg PO TID PRN 02/21/19 


Doxylamine Succinate [Unisom] 25 mg PO HS 02/21/19 


Psyllium Husk [Metamucil] 660 gm PO DAILY 02/21/19 


Sertraline HCl [Zoloft] 100 mg PO DAILY #30 tablet 02/21/19 











- AMA


Did Patient Leave Against Medical Advice: Yes

## 2019-12-17 VITALS — SYSTOLIC BLOOD PRESSURE: 115 MMHG | DIASTOLIC BLOOD PRESSURE: 77 MMHG | TEMPERATURE: 97.7 F | HEART RATE: 114 BPM

## 2019-12-17 RX ADMIN — ANALGESIC BALM SCH: 1.74; 4.06 OINTMENT TOPICAL at 10:33

## 2019-12-17 RX ADMIN — LIDOCAINE SCH: 50 PATCH TOPICAL at 10:34

## 2019-12-17 RX ADMIN — SERTRALINE HYDROCHLORIDE SCH: 50 TABLET ORAL at 10:34

## 2019-12-17 RX ADMIN — Medication SCH: at 10:34

## 2019-12-17 NOTE — DS
[facility] Detox Discharge Summary


Admission Date: 


19





Discharge Date: 19





- History


Present History: Alcohol Dependence


Additional Comments: 





74 years old male admitted on 19 for alcohol withdrawal sx management 


treated with ativan detox regimen


patient tolerated well alert oriented x 3


patient prefers to leave the detox one day early as estimated discharge day of 

19


patient insists to leave the detox today "thing to do"


case discuss with the nurse that routine discharge is appropriated 


cardiac s1s2 regular rate of 85 rhythm


respiratory clear lung bilaterally on auscultation 


extremities full range of motion








- Physical Exam Results


Vital Signs: 


 Vital Signs











Temperature  97.7 F   19 09:23


 


Pulse Rate  114 H  19 09:23


 


Respiratory Rate  18   19 09:23


 


Blood Pressure  115/77   19 09:23


 


O2 Sat by Pulse Oximetry (%)      








pulse rate repeated at 85 ppm


Pertinent Admission Physical Exam Findings: 





alcohol withdrawal sx


 Laboratory Last Values











WBC  3.0 K/mm3 (4.0-10.0)  L  12/15/19  08:00    


 


RBC  4.11 M/mm3 (4.00-5.60)   12/15/19  08:00    


 


Hgb  13.2 GM/dL (11.7-16.9)   12/15/19  08:00    


 


Hct  39.2 % (35.4-49)   12/15/19  08:00    


 


MCV  95.4 fl (80-96)   12/15/19  08:00    


 


MCH  32.1 pg (25.7-33.7)   12/15/19  08:00    


 


MCHC  33.6 g/dl (32.0-35.9)   12/15/19  08:00    


 


RDW  14.0 % (11.9-15.9)   12/15/19  08:00    


 


Plt Count  212 K/MM3 (134-434)   12/15/19  08:00    


 


MPV  7.8 fl (7.5-11.1)   12/15/19  08:00    


 


Sodium  140 mmol/L (136-145)   12/15/19  08:00    


 


Potassium  3.9 mmol/L (3.5-5.1)   12/15/19  08:00    


 


Chloride  107 mmol/L ()   12/15/19  08:00    


 


Carbon Dioxide  26 mmol/L (21-32)   12/15/19  08:00    


 


Anion Gap  7 MMOL/L (8-16)  L  12/15/19  08:00    


 


BUN  9.7 mg/dL (7-18)   12/15/19  08:00    


 


Creatinine  1.1 mg/dL (0.55-1.3)   12/15/19  08:00    


 


Est GFR (CKD-EPI)AfAm  76.78   12/15/19  08:00    


 


Est GFR (CKD-EPI)NonAf  66.24   12/15/19  08:00    


 


Random Glucose  98 mg/dL ()   12/15/19  08:00    


 


Calcium  8.7 mg/dL (8.5-10.1)   12/15/19  08:00    


 


Total Bilirubin  0.9 mg/dL (0.2-1)   12/15/19  08:00    


 


AST  20 U/L (15-37)   12/15/19  08:00    


 


ALT  15 U/L (13-61)   12/15/19  08:00    


 


Alkaline Phosphatase  91 U/L ()   12/15/19  08:00    


 


Total Protein  7.4 g/dl (6.4-8.2)   12/15/19  08:00    


 


Albumin  3.5 g/dl (3.4-5.0)   12/15/19  08:00    


 


RPR Titer  Nonreactive  (NONREACTIVE)   12/15/19  08:00    








lab noted


patient agrees to bringing in lab report to [facility] for follow 

up 





- Treatment


Hospital Course: Detox Protocol Followed, Detoxed Safely, Responded well, 

Discharged Condition Good, Rehab Referral Accepted


Patient has Accepted a Rehab Referral to: community support approach





- Medication


Discharge Medications: 


Ambulatory Orders





Albuterol Sulfate Inhaler - [Ventolin Hfa Inhaler -] 2 inh PO Q4H PRN 19 


Atorvastatin Ca [Lipitor] 10 mg PO HS 19 


Cyclobenzaprine HCl [Flexeril -] 10 mg PO TID PRN 19 


Doxylamine Succinate [Unisom] 25 mg PO HS 19 


Psyllium Husk [Metamucil] 660 gm PO DAILY 19 


Sertraline HCl [Zoloft] 100 mg PO DAILY #30 tablet 19 











- Diagnosis


(1) COPD (chronic obstructive pulmonary disease)


Status: Chronic   


Qualifiers: 


   COPD type: emphysema   Emphysema type: other   Qualified Code(s): J43.8 - 

Other emphysema   





(2) Alcohol dependence with uncomplicated withdrawal


Status: Acute   





(3) Hepatitis C virus infection cured after antiviral drug therapy


Status: Chronic   





(4) Hypercholesteremia


Status: Chronic   





(5) Peptic ulcer disease


Status: Chronic   





- AMA


Did Patient Leave Against Medical Advice: No





CIWA Score





- CIWA Score


Nausea/Vomitin-Mild Nausea/No Vomiting


Muscle Tremors: 2


Anxiety: 2


Agitation: 1-Slight > Activity


Paroxysmal Sweats: No Perspiration


Orientation: 0-Oriented


Tacttile Disturbances: 0-None


Auditory Disturbances: 0-None


Visual Disturbances: 0-None


Headache: 0-None Present


CIWA-Ar Total Score: 6

## 2025-03-17 ENCOUNTER — HOSPITAL ENCOUNTER (INPATIENT)
Dept: HOSPITAL 74 - YASAS | Age: 80
LOS: 4 days | Discharge: HOME | DRG: 897 | End: 2025-03-21
Attending: ALLERGY & IMMUNOLOGY | Admitting: ALLERGY & IMMUNOLOGY
Payer: COMMERCIAL

## 2025-03-17 VITALS — BODY MASS INDEX: 20.7 KG/M2

## 2025-03-17 DIAGNOSIS — R73.9: ICD-10-CM

## 2025-03-17 DIAGNOSIS — Z87.891: ICD-10-CM

## 2025-03-17 DIAGNOSIS — M17.0: ICD-10-CM

## 2025-03-17 DIAGNOSIS — F12.20: ICD-10-CM

## 2025-03-17 DIAGNOSIS — D32.9: ICD-10-CM

## 2025-03-17 DIAGNOSIS — Z89.021: ICD-10-CM

## 2025-03-17 DIAGNOSIS — F32.9: ICD-10-CM

## 2025-03-17 DIAGNOSIS — D72.819: ICD-10-CM

## 2025-03-17 DIAGNOSIS — Z86.19: ICD-10-CM

## 2025-03-17 DIAGNOSIS — F10.230: Primary | ICD-10-CM

## 2025-03-17 PROCEDURE — G0008 ADMIN INFLUENZA VIRUS VAC: HCPCS

## 2025-03-17 PROCEDURE — HZ2ZZZZ DETOXIFICATION SERVICES FOR SUBSTANCE ABUSE TREATMENT: ICD-10-PCS | Performed by: ALLERGY & IMMUNOLOGY

## 2025-03-17 RX ADMIN — Medication SCH MG: at 22:23

## 2025-03-18 LAB
ALBUMIN SERPL-MCNC: 3.7 G/DL (ref 3.4–5)
ALP SERPL-CCNC: 84 U/L (ref 45–117)
ALT SERPL-CCNC: 18 U/L (ref 13–61)
ANION GAP SERPL CALC-SCNC: 8 MMOL/L (ref 4–13)
AST SERPL-CCNC: 38 U/L (ref 15–37)
BILIRUB SERPL-MCNC: 2.3 MG/DL (ref 0.2–1)
BUN SERPL-MCNC: 7.3 MG/DL (ref 7–18)
CALCIUM SERPL-MCNC: 9.3 MG/DL (ref 8.5–10.1)
CHLORIDE SERPL-SCNC: 105 MMOL/L (ref 98–107)
CO2 SERPL-SCNC: 25 MMOL/L (ref 21–32)
CREAT SERPL-MCNC: 1.1 MG/DL (ref 0.55–1.3)
DEPRECATED RDW RBC AUTO: 16.5 % (ref 11.9–15.9)
GLUCOSE SERPL-MCNC: 129 MG/DL (ref 74–106)
HCT VFR BLD CALC: 40.7 % (ref 35.4–49)
HGB BLD-MCNC: 13.5 GM/DL (ref 11.7–16.9)
MCH RBC QN AUTO: 35.2 PG (ref 25.7–33.7)
MCHC RBC AUTO-ENTMCNC: 33.1 G/DL (ref 32–35.9)
MCV RBC: 106.6 FL (ref 80–96)
PLATELET # BLD AUTO: 235 10^3/UL (ref 134–434)
PMV BLD: 8 FL (ref 7.5–11.1)
POTASSIUM SERPLBLD-SCNC: 3.7 MMOL/L (ref 3.5–5.1)
PROT SERPL-MCNC: 7.5 G/DL (ref 6.4–8.2)
RBC # BLD AUTO: 3.82 M/MM3 (ref 4–5.6)
SODIUM SERPL-SCNC: 138 MMOL/L (ref 136–145)
WBC # BLD AUTO: 2.9 K/MM3 (ref 4–10)

## 2025-03-18 RX ADMIN — INFLUENZA VIRUS VACCINE ONE MCG: 15; 15; 15 SUSPENSION INTRAMUSCULAR at 11:33

## 2025-03-18 RX ADMIN — TUBERCULIN PURIFIED PROTEIN DERIVATIVE ONE UNIT: 5 INJECTION, SOLUTION INTRADERMAL at 10:21

## 2025-03-18 RX ADMIN — Medication SCH TAB: at 10:21

## 2025-03-20 VITALS — RESPIRATION RATE: 16 BRPM

## 2025-03-20 RX ADMIN — BUDESONIDE AND FORMOTEROL FUMARATE DIHYDRATE SCH PUFF: 160; 4.5 AEROSOL RESPIRATORY (INHALATION) at 23:16

## 2025-03-20 RX ADMIN — HYDROXYZINE PAMOATE PRN MG: 25 CAPSULE ORAL at 17:55

## 2025-03-20 RX ADMIN — NALTREXONE HYDROCHLORIDE ONE MG: 50 TABLET, FILM COATED ORAL at 11:00

## 2025-03-20 RX ADMIN — METHOCARBAMOL PRN MG: 500 TABLET ORAL at 10:07

## 2025-03-21 VITALS — DIASTOLIC BLOOD PRESSURE: 91 MMHG | TEMPERATURE: 98.6 F | SYSTOLIC BLOOD PRESSURE: 131 MMHG | HEART RATE: 60 BPM

## 2025-03-21 RX ADMIN — NALTREXONE HYDROCHLORIDE SCH MG: 50 TABLET, FILM COATED ORAL at 09:41
